# Patient Record
Sex: FEMALE | Race: OTHER | HISPANIC OR LATINO
[De-identification: names, ages, dates, MRNs, and addresses within clinical notes are randomized per-mention and may not be internally consistent; named-entity substitution may affect disease eponyms.]

---

## 2017-02-16 ENCOUNTER — APPOINTMENT (OUTPATIENT)
Dept: OTOLARYNGOLOGY | Facility: CLINIC | Age: 62
End: 2017-02-16

## 2017-02-16 VITALS
DIASTOLIC BLOOD PRESSURE: 84 MMHG | SYSTOLIC BLOOD PRESSURE: 128 MMHG | BODY MASS INDEX: 21.4 KG/M2 | WEIGHT: 109 LBS | HEIGHT: 60 IN

## 2017-02-16 DIAGNOSIS — E73.9 LACTOSE INTOLERANCE, UNSPECIFIED: ICD-10-CM

## 2017-02-16 DIAGNOSIS — I10 ESSENTIAL (PRIMARY) HYPERTENSION: ICD-10-CM

## 2017-02-16 DIAGNOSIS — K58.9 IRRITABLE BOWEL SYNDROME W/OUT DIARRHEA: ICD-10-CM

## 2017-02-16 DIAGNOSIS — F41.9 ANXIETY DISORDER, UNSPECIFIED: ICD-10-CM

## 2017-02-16 DIAGNOSIS — E78.00 PURE HYPERCHOLESTEROLEMIA, UNSPECIFIED: ICD-10-CM

## 2017-02-16 DIAGNOSIS — S22.39XA FRACTURE OF ONE RIB, UNSPECIFIED SIDE, INITIAL ENCOUNTER FOR CLOSED FRACTURE: ICD-10-CM

## 2017-02-16 DIAGNOSIS — M19.90 UNSPECIFIED OSTEOARTHRITIS, UNSPECIFIED SITE: ICD-10-CM

## 2017-02-16 DIAGNOSIS — Z82.3 FAMILY HISTORY OF STROKE: ICD-10-CM

## 2017-02-16 DIAGNOSIS — M26.601 RIGHT TEMPOROMANDIBULAR JOINT DISORDER,: ICD-10-CM

## 2017-02-16 DIAGNOSIS — S62.90XA UNSPECIFIED FRACTURE OF UNSPECIFIED WRIST AND HAND, INITIAL ENCOUNTER FOR CLOSED FRACTURE: ICD-10-CM

## 2017-02-16 DIAGNOSIS — Z86.018 PERSONAL HISTORY OF OTHER BENIGN NEOPLASM: ICD-10-CM

## 2017-02-18 PROBLEM — I10 HIGH BLOOD PRESSURE: Status: ACTIVE | Noted: 2017-02-16

## 2017-02-18 PROBLEM — F41.9 ANXIETY: Status: ACTIVE | Noted: 2017-02-18

## 2017-02-18 PROBLEM — S22.39XA FRACTURED RIB: Status: ACTIVE | Noted: 2017-02-16

## 2017-02-18 PROBLEM — K58.9 IBS (IRRITABLE BOWEL SYNDROME): Status: ACTIVE | Noted: 2017-02-16

## 2017-02-18 PROBLEM — E78.00 HIGH CHOLESTEROL: Status: ACTIVE | Noted: 2017-02-16

## 2017-02-18 PROBLEM — Z82.3 FAMILY HISTORY OF CEREBROVASCULAR ACCIDENT (CVA): Status: ACTIVE | Noted: 2017-02-16

## 2017-02-18 PROBLEM — E73.9 LACTOSE INTOLERANCE: Status: ACTIVE | Noted: 2017-02-18

## 2017-02-18 PROBLEM — S62.90XA FRACTURED HAND: Status: ACTIVE | Noted: 2017-02-16

## 2017-02-18 PROBLEM — M19.90 ARTHRITIS: Status: ACTIVE | Noted: 2017-02-16

## 2017-02-18 PROBLEM — Z86.018 HISTORY OF UTERINE LEIOMYOMA: Status: RESOLVED | Noted: 2017-02-18 | Resolved: 2017-02-18

## 2017-02-18 RX ORDER — VALSARTAN 320 MG/1
320 TABLET ORAL DAILY
Refills: 0 | Status: ACTIVE | COMMUNITY

## 2017-02-18 RX ORDER — PRAVASTATIN SODIUM 20 MG/1
20 TABLET ORAL DAILY
Refills: 0 | Status: ACTIVE | COMMUNITY

## 2017-02-18 RX ORDER — SIMETHICONE 250 MG/1
CAPSULE, GELATIN COATED ORAL
Refills: 0 | Status: ACTIVE | COMMUNITY

## 2017-02-18 RX ORDER — IBUPROFEN 200 MG/1
TABLET, COATED ORAL
Refills: 0 | Status: ACTIVE | COMMUNITY

## 2017-02-18 RX ORDER — NEBIVOLOL HYDROCHLORIDE 5 MG/1
5 TABLET ORAL DAILY
Refills: 0 | Status: ACTIVE | COMMUNITY

## 2017-02-18 RX ORDER — DIAZEPAM 5 MG/1
5 TABLET ORAL TWICE DAILY
Refills: 0 | Status: ACTIVE | COMMUNITY

## 2017-02-22 PROBLEM — M26.601 DISORDER OF RIGHT TEMPOROMANDIBULAR JOINT: Status: ACTIVE | Noted: 2017-02-22

## 2017-03-16 ENCOUNTER — APPOINTMENT (OUTPATIENT)
Dept: OTOLARYNGOLOGY | Facility: CLINIC | Age: 62
End: 2017-03-16

## 2017-03-16 VITALS
DIASTOLIC BLOOD PRESSURE: 74 MMHG | BODY MASS INDEX: 21.4 KG/M2 | WEIGHT: 109 LBS | SYSTOLIC BLOOD PRESSURE: 116 MMHG | HEIGHT: 60 IN

## 2017-11-16 ENCOUNTER — EMERGENCY (EMERGENCY)
Facility: HOSPITAL | Age: 62
LOS: 1 days | Discharge: HOME | End: 2017-11-16
Attending: EMERGENCY MEDICINE | Admitting: EMERGENCY MEDICINE
Payer: COMMERCIAL

## 2017-11-16 VITALS
OXYGEN SATURATION: 99 % | TEMPERATURE: 98 F | DIASTOLIC BLOOD PRESSURE: 75 MMHG | HEART RATE: 88 BPM | SYSTOLIC BLOOD PRESSURE: 108 MMHG | RESPIRATION RATE: 18 BRPM

## 2017-11-16 VITALS
HEART RATE: 95 BPM | TEMPERATURE: 98 F | RESPIRATION RATE: 18 BRPM | SYSTOLIC BLOOD PRESSURE: 107 MMHG | DIASTOLIC BLOOD PRESSURE: 70 MMHG | OXYGEN SATURATION: 97 %

## 2017-11-16 LAB
ALBUMIN SERPL ELPH-MCNC: 3.6 G/DL — SIGNIFICANT CHANGE UP (ref 3.3–5)
ALP SERPL-CCNC: 157 U/L — HIGH (ref 40–120)
ALT FLD-CCNC: 117 U/L RC — HIGH (ref 10–45)
ANION GAP SERPL CALC-SCNC: 14 MMOL/L — SIGNIFICANT CHANGE UP (ref 5–17)
APPEARANCE UR: CLEAR — SIGNIFICANT CHANGE UP
AST SERPL-CCNC: 43 U/L — HIGH (ref 10–40)
BACTERIA # UR AUTO: ABNORMAL /HPF
BASOPHILS # BLD AUTO: 0 K/UL — SIGNIFICANT CHANGE UP (ref 0–0.2)
BASOPHILS NFR BLD AUTO: 0.5 % — SIGNIFICANT CHANGE UP (ref 0–2)
BILIRUB SERPL-MCNC: 0.4 MG/DL — SIGNIFICANT CHANGE UP (ref 0.2–1.2)
BILIRUB UR-MCNC: NEGATIVE — SIGNIFICANT CHANGE UP
BUN SERPL-MCNC: 12 MG/DL — SIGNIFICANT CHANGE UP (ref 7–23)
CALCIUM SERPL-MCNC: 9.1 MG/DL — SIGNIFICANT CHANGE UP (ref 8.4–10.5)
CHLORIDE SERPL-SCNC: 99 MMOL/L — SIGNIFICANT CHANGE UP (ref 96–108)
CO2 SERPL-SCNC: 25 MMOL/L — SIGNIFICANT CHANGE UP (ref 22–31)
COLOR SPEC: YELLOW — SIGNIFICANT CHANGE UP
CREAT SERPL-MCNC: 0.47 MG/DL — LOW (ref 0.5–1.3)
DIFF PNL FLD: NEGATIVE — SIGNIFICANT CHANGE UP
EOSINOPHIL # BLD AUTO: 0 K/UL — SIGNIFICANT CHANGE UP (ref 0–0.5)
EOSINOPHIL NFR BLD AUTO: 0 % — SIGNIFICANT CHANGE UP (ref 0–6)
EPI CELLS # UR: SIGNIFICANT CHANGE UP /HPF
ERYTHROCYTE [SEDIMENTATION RATE] IN BLOOD: 63 MM/HR — HIGH (ref 0–20)
GLUCOSE SERPL-MCNC: 137 MG/DL — HIGH (ref 70–99)
GLUCOSE UR QL: NEGATIVE — SIGNIFICANT CHANGE UP
HCT VFR BLD CALC: 39.6 % — SIGNIFICANT CHANGE UP (ref 34.5–45)
HGB BLD-MCNC: 13.9 G/DL — SIGNIFICANT CHANGE UP (ref 11.5–15.5)
KETONES UR-MCNC: NEGATIVE — SIGNIFICANT CHANGE UP
LEUKOCYTE ESTERASE UR-ACNC: ABNORMAL
LYMPHOCYTES # BLD AUTO: 0.4 K/UL — LOW (ref 1–3.3)
LYMPHOCYTES # BLD AUTO: 7.5 % — LOW (ref 13–44)
MCHC RBC-ENTMCNC: 29.7 PG — SIGNIFICANT CHANGE UP (ref 27–34)
MCHC RBC-ENTMCNC: 35 GM/DL — SIGNIFICANT CHANGE UP (ref 32–36)
MCV RBC AUTO: 84.8 FL — SIGNIFICANT CHANGE UP (ref 80–100)
MONOCYTES # BLD AUTO: 0.2 K/UL — SIGNIFICANT CHANGE UP (ref 0–0.9)
MONOCYTES NFR BLD AUTO: 3.8 % — SIGNIFICANT CHANGE UP (ref 2–14)
NEUTROPHILS # BLD AUTO: 4.7 K/UL — SIGNIFICANT CHANGE UP (ref 1.8–7.4)
NEUTROPHILS NFR BLD AUTO: 88.2 % — HIGH (ref 43–77)
NITRITE UR-MCNC: NEGATIVE — SIGNIFICANT CHANGE UP
PH UR: 6.5 — SIGNIFICANT CHANGE UP (ref 5–8)
PLATELET # BLD AUTO: 277 K/UL — SIGNIFICANT CHANGE UP (ref 150–400)
POTASSIUM SERPL-MCNC: 3.6 MMOL/L — SIGNIFICANT CHANGE UP (ref 3.5–5.3)
POTASSIUM SERPL-SCNC: 3.6 MMOL/L — SIGNIFICANT CHANGE UP (ref 3.5–5.3)
PROT SERPL-MCNC: 7 G/DL — SIGNIFICANT CHANGE UP (ref 6–8.3)
PROT UR-MCNC: 30 MG/DL
RBC # BLD: 4.67 M/UL — SIGNIFICANT CHANGE UP (ref 3.8–5.2)
RBC # FLD: 11.7 % — SIGNIFICANT CHANGE UP (ref 10.3–14.5)
RBC CASTS # UR COMP ASSIST: ABNORMAL /HPF (ref 0–2)
SODIUM SERPL-SCNC: 138 MMOL/L — SIGNIFICANT CHANGE UP (ref 135–145)
SP GR SPEC: 1.01 — SIGNIFICANT CHANGE UP (ref 1.01–1.02)
UROBILINOGEN FLD QL: NEGATIVE — SIGNIFICANT CHANGE UP
WBC # BLD: 5.3 K/UL — SIGNIFICANT CHANGE UP (ref 3.8–10.5)
WBC # FLD AUTO: 5.3 K/UL — SIGNIFICANT CHANGE UP (ref 3.8–10.5)
WBC UR QL: ABNORMAL /HPF (ref 0–5)

## 2017-11-16 PROCEDURE — 86140 C-REACTIVE PROTEIN: CPT

## 2017-11-16 PROCEDURE — 87186 SC STD MICRODIL/AGAR DIL: CPT

## 2017-11-16 PROCEDURE — 85027 COMPLETE CBC AUTOMATED: CPT

## 2017-11-16 PROCEDURE — 99284 EMERGENCY DEPT VISIT MOD MDM: CPT

## 2017-11-16 PROCEDURE — 80053 COMPREHEN METABOLIC PANEL: CPT

## 2017-11-16 PROCEDURE — 81001 URINALYSIS AUTO W/SCOPE: CPT

## 2017-11-16 PROCEDURE — 87086 URINE CULTURE/COLONY COUNT: CPT

## 2017-11-16 PROCEDURE — 85652 RBC SED RATE AUTOMATED: CPT

## 2017-11-16 RX ORDER — CEPHALEXIN 500 MG
1 CAPSULE ORAL
Qty: 20 | Refills: 0 | OUTPATIENT
Start: 2017-11-16 | End: 2017-11-26

## 2017-11-16 RX ORDER — CEPHALEXIN 500 MG
1 CAPSULE ORAL
Qty: 10 | Refills: 0 | OUTPATIENT
Start: 2017-11-16 | End: 2017-11-21

## 2017-11-16 RX ORDER — CEPHALEXIN 500 MG
500 CAPSULE ORAL ONCE
Qty: 0 | Refills: 0 | Status: COMPLETED | OUTPATIENT
Start: 2017-11-16 | End: 2017-11-16

## 2017-11-16 RX ADMIN — Medication 500 MILLIGRAM(S): at 18:17

## 2017-11-16 NOTE — CONSULT NOTE ADULT - ATTENDING COMMENTS
I have reviewed the residents note including the history, exam, assessment, and plan.  I agree with the residents assessment and plan.    Halina Arshad MD

## 2017-11-16 NOTE — ED ADULT NURSE NOTE - OBJECTIVE STATEMENT
This 62 female in ED for headache onset Monday was admitted to [facility] in [place] for the past, was not happy with the care. Pt states was diagnosed with UTI and Temporal Arthritis at the [facility]

## 2017-11-16 NOTE — ED PROVIDER NOTE - MEDICAL DECISION MAKING DETAILS
62F h/o anxiety, HTN presents with headache, UTI. R/o temporal arteritis (ESR, steroids) vs. migraine (reglan, fluids, tylenol). UA, UCs, ABx if warranted.

## 2017-11-16 NOTE — CONSULT NOTE ADULT - SUBJECTIVE AND OBJECTIVE BOX
[facility] Ophthalmology Consult Note    HPI: 62F h/o anxiety, HTN, HLD, IBS presents here after leaving AMA from OSH after being admitted for 2 days for UTI and severe headache. Team wanted to do temporal artery biopsy due to high ESR but patient refused. Has had intermittent temporal headache left side for 4 days. Had fever few days ago. Lost weight over last several months due to abdominal pain from IBS. No scalp tenderness, jaw claudication, proximal muscle pain. No ocular visual signs, symptoms.    PMH: anxiety, HTN, HL  Meds:   · 	Diovan: Last Dose Taken:    · 	Bystolic: Last Dose Taken:    · 	pravastatin: Last Dose Taken:    · 	Valium: Last Dose Taken:    POcHx (including surgeries/lasers/trauma):  None  Drops: None  FamHx: None  Social Hx: None  Allergies: PCN, fluoroquinolones, lexapro, paxil    ROS:  General (neg), Vision (per HPI), Head and Neck (neg), Pulm (neg), CV (neg), GI (neg),  (neg), Musculoskeletal (neg), Skin/Integ (neg), Neuro (neg), Endocrine (neg), Heme (neg), All/Immuno (neg)    Mood and Affect Appropriate ( x ),  Oriented to Time, Place, and Person x 3 ( x )    Ophthalmology Exam    Visual acuity (sc): 20/20 OU  Pupils: PERRL OU, no APD  Ttono: 16 OU  Extraocular movements (EOMs): Full OU, no pain, no diplopia   Confrontational Visual Field (CVF):  Full OU  Color Plates: 12/12 OU    Pen Light Exam (PLE)  External:  Flat OU, no scalp or temporal artery tenderness, good temporal artery pulsations  Lids/Lashes/Lacrimal Ducts: Flat OU    Sclera/Conjunctiva:  W+Q OU  Cornea: Cl OU  Anterior Chamber: D+F OU  Iris:  Flat OU  Lens:  Cl OU    Fundus Exam: dilated with 1% tropicamide and 2.5% phenylephrine  Approval obtained from primary team for dilation  Patient aware that pupils can remained dilated for at least 4-6 hours  Exam performed with 20D lens    Vitreous: wnl OU  Disc, cup/disc: sharp and pink, 0.4 OU  Macula:  wnl OU  Vessels:  wnl OU  Periphery: wnl OU    ESR 63  Plt 277  CRP pending    Assessment: 61 yo F with 3 days temporal headache, recent fever. No other signs or symptoms of GCA. Normal eye exam - no ocular findings consistent with GCA. ESR high but ?from UTI. Plt normal and CRP pending.    Plan:  Follow CRP    Follow-Up:  Patient should follow up with neuro-ophthalmology (Dr Mckenna) on Monday  [address]  [place] [address]  [phone] [facility] Ophthalmology Consult Note    HPI: 62F h/o anxiety, HTN, HLD, IBS presents here after leaving AMA from OSH after being admitted for 2 days for UTI and severe headache. Team wanted to do temporal artery biopsy due to high ESR but patient refused. Has had intermittent temporal headache left side for 4 days. Had fever few days ago. Lost weight over last several months due to abdominal pain from IBS. No scalp tenderness, jaw claudication, proximal muscle pain. No ocular visual signs, symptoms.    PMH: anxiety, HTN, HL  Meds:   · 	Diovan: Last Dose Taken:    · 	Bystolic: Last Dose Taken:    · 	pravastatin: Last Dose Taken:    · 	Valium: Last Dose Taken:    POcHx (including surgeries/lasers/trauma):  None  Drops: None  FamHx: None  Social Hx: None  Allergies: PCN, fluoroquinolones, lexapro, paxil    ROS:  General (neg), Vision (per HPI), Head and Neck (neg), Pulm (neg), CV (neg), GI (neg),  (neg), Musculoskeletal (neg), Skin/Integ (neg), Neuro (neg), Endocrine (neg), Heme (neg), All/Immuno (neg)    Mood and Affect Appropriate ( x ),  Oriented to Time, Place, and Person x 3 ( x )    Ophthalmology Exam    Visual acuity (sc): 20/20 OU  Pupils: PERRL OU, no APD  Ttono: 16 OU  Extraocular movements (EOMs): Full OU, no pain, no diplopia   Confrontational Visual Field (CVF):  Full OU  Color Plates: 12/12 OU    Pen Light Exam (PLE)  External:  Flat OU, no scalp or temporal artery tenderness, good temporal artery pulsations  Lids/Lashes/Lacrimal Ducts: Flat OU    Sclera/Conjunctiva:  W+Q OU  Cornea: Cl OU  Anterior Chamber: D+F OU  Iris:  Flat OU  Lens:  Cl OU    Fundus Exam: dilated with 1% tropicamide and 2.5% phenylephrine  Approval obtained from primary team for dilation  Patient aware that pupils can remained dilated for at least 4-6 hours  Exam performed with 20D lens    Vitreous: wnl OU  Disc, cup/disc: sharp and pink, 0.4 OU  Macula:  wnl OU  Vessels:  wnl OU  Periphery: wnl OU    ESR 63  Plt 277  CRP pending

## 2017-11-16 NOTE — ED PROVIDER NOTE - PLAN OF CARE
Call your primary care doctor and ophthomologist today or tomorrow to schedule follow up appointment for within the next 3-5 days.  Continue taking your medications as prescribed.  Return to this Emergency Department if you experience worsening condition or for any other emergency.

## 2017-11-16 NOTE — ED ADULT NURSE REASSESSMENT NOTE - NS ED NURSE REASSESS COMMENT FT1
report received from Beverley THOMPSON  pt mentating at baseline. no change in neuro status. pt denies pain at this time. pt placed in position of comfort, given warm blankets. safety maintained. will continue to monitor, support and safety provided. awaiting dispo

## 2017-11-16 NOTE — ED PROVIDER NOTE - OBJECTIVE STATEMENT
62F h/o anxiety, HTN, HLD presents here after leaving AMA from OSH after being admitted for 2 days for UTI and severe headache, which was diagnosed there as temporal arteritis due to "high" ESR rate, but patient refused biopsy. Here in the ED she still complains of dysuria, as well as right sided temporal headache, sensitive to light. She was given steroids. She has no other complaints. Denies fever, chills, chest pain. SOB.

## 2017-11-16 NOTE — ED PROVIDER NOTE - CARE PLAN
Principal Discharge DX:	Headache  Instructions for follow-up, activity and diet:	Call your primary care doctor and ophthomologist today or tomorrow to schedule follow up appointment for within the next 3-5 days.  Continue taking your medications as prescribed.  Return to this Emergency Department if you experience worsening condition or for any other emergency.  Secondary Diagnosis:	UTI (urinary tract infection)

## 2017-11-16 NOTE — ED PROVIDER NOTE - CHIEF COMPLAINT
The patient is a 62y Female complaining of see chief complaint quote. The patient is a 62y Female complaining of headache.

## 2017-11-16 NOTE — CONSULT NOTE ADULT - ASSESSMENT
Assessment: 63 yo F with 3 days temporal headache, recent fever. No other signs or symptoms of GCA. Normal eye exam - no ocular findings consistent with GCA. ESR high but ?from UTI. Plt normal and CRP pending.    Plan:  Follow CRP    Follow-Up:  Patient should follow up with neuro-ophthalmology (Dr Mckenna) on Monday  [address]  [place] [address]  [phone]

## 2017-11-16 NOTE — ED PROVIDER NOTE - ATTENDING CONTRIBUTION TO CARE
62F h/o anxiety, HTN, HLD presents here after leaving AMA from OSH after being admitted for 2 days for UTI and severe headache, which was diagnosed there as temporal arteritis due to "high" ESR rate, but patient refused biopsy because her daughter "didn't think she was getting the care she needed." daughter signed her out AMA today because "[place] is too far from my home" and "I wanted her closer." pt states her headache is completely resolved, but has ongoing mild photophobia. states over last week has had left temporal pain which she states is now resolved as well. no n/v. no neck pain or meningismus, no f/c.     ROS:   constitutional - no fever, no chills  eyes - no visual changes, no redness  eent - no sore throat, no nasal congestion  cvs - no chest pain, no leg swelling  resp - no shortness of breath, no cough  gi - no abdominal pain, no vomiting, no diarrhea  gu - no dysuria, no hematuria  msk - no acute back pain, no joint swelling  skin - no rashes, no jaundice  neuro - + headache, no focal weakness  psych - no acute mental health issue     Physical Exam:   constitutional - well appearing, awake and alert, oriented x3  head - no external evidence of trauma  cvs - rrr, no murmurs, no peripheral edema  resp - breath sounds clear and equal bilat  gi - abdomen soft and nontender, no rigidity, guarding or rebound, bowel sounds present  msk - moving all extremities spontaneously  neuro - alert and oriented x3, no focal deficits, CNs 2-12 grossly intact. no pronator drift. strength 5/5 in all ext. no meningismus. no cerebellar ataxia. eoms intact. no overt papilledema. no temporal tenderness, no hyperesthesia.   skin- no jaundice, warm and dry  psych - mood and affect wnl, no apparent risk to self or others 62F h/o anxiety, HTN, HLD presents here after leaving AMA from OSH after being admitted for 2 days for UTI and severe headache, which was diagnosed there as temporal arteritis due to "high" ESR rate, but patient refused biopsy because her daughter "didn't think she was getting the care she needed." daughter signed her out AMA today because "[place] is too far from my home" and "I wanted her closer." pt states her headache is completely resolved, but has ongoing mild photophobia. states over last week has had left temporal pain which she states is now resolved as well. no n/v. no neck pain or meningismus, no f/c.     ROS:   constitutional - no fever, no chills  eyes - no visual changes, no redness  eent - no sore throat, no nasal congestion  cvs - no chest pain, no leg swelling  resp - no shortness of breath, no cough  gi - no abdominal pain, no vomiting, no diarrhea  gu - no dysuria, no hematuria  msk - no acute back pain, no joint swelling  skin - no rashes, no jaundice  neuro - + headache, no focal weakness  psych - no acute mental health issue     Physical Exam:   constitutional - well appearing, awake and alert, oriented x3  head - no external evidence of trauma  cvs - rrr, no murmurs, no peripheral edema  resp - breath sounds clear and equal bilat  gi - abdomen soft and nontender, no rigidity, guarding or rebound, bowel sounds present  msk - moving all extremities spontaneously  neuro - alert and oriented x3, no focal deficits, CNs 2-12 grossly intact. no pronator drift. strength 5/5 in all ext. no meningismus. no cerebellar ataxia. eoms intact. no overt papilledema. no temporal tenderness, no hyperesthesia.   skin- no jaundice, warm and dry  psych - mood and affect wnl, no apparent risk to self or others    paperwork reviwed from [facility] - ct head wnl, ua pos for ecoli and cultures sensitive for cephalosporins. team did not fax the sed rate. pt seen by Ophtho in ED without any acute eye findings. pt states headache is mostly resolved. ophtho recommended no steroids, and to f/u w neurooptho Pomeranz on monday. 62F h/o anxiety, HTN, HLD presents here after leaving AMA from OSH after being admitted for 2 days for UTI and severe headache, which was diagnosed there as temporal arteritis due to "high" ESR rate, but patient refused biopsy because her daughter "didn't think she was getting the care she needed." daughter signed her out AMA today because "[place] is too far from my home" and "I wanted her closer." daughter in ED w expectation for direct admission to continue w/u that she was undergoing at prior hospital - family has no records from prior hospitalization. pt states her headache is currently completely resolved, but has ongoing mild photophobia. states over last week has had left temporal pain which she states is now resolved as well. no n/v. no neck pain or meningismus, no f/c. states was dx w uti at prior hospital and was receiving iv abx. no f/c, no current uti sxs.    ROS:   constitutional - no fever, no chills  eyes - no visual changes, no redness  eent - no sore throat, no nasal congestion  cvs - no chest pain, no leg swelling  resp - no shortness of breath, no cough  gi - no abdominal pain, no vomiting, no diarrhea  gu - no dysuria, no hematuria  msk - no acute back pain, no joint swelling  skin - no rashes, no jaundice  neuro - + headache, no focal weakness  psych - no acute mental health issue     Physical Exam:   constitutional - well appearing, awake and alert, oriented x3, wearing sunglasses in ED  head - no external evidence of trauma  cvs - rrr, no murmurs, no peripheral edema  resp - breath sounds clear and equal bilat  gi - abdomen soft and nontender, no rigidity, guarding or rebound, bowel sounds present  msk - moving all extremities spontaneously  neuro - alert and oriented x3, no focal deficits, CNs 2-12 grossly intact. no pronator drift. strength 5/5 in all ext. no meningismus. no cerebellar ataxia. eoms intact. no overt papilledema. no temporal tenderness, no hyperesthesia. no rashes overlying face. pupils elie.  skin- no jaundice, warm and dry  psych - mood and affect wnl, no apparent risk to self or others    paperwork reviwed from [facility] after written consent obtained and faxed to [facility] - ct head wnl, ua pos for ecoli and cultures sensitive for cephalosporins. team did not fax the sed rate. pt states she was never seen by ophtho at prior hospital and that they were "planning to do a biopsy." pt seen by Ophtho in ED without any acute eye findings. pt states headache is mostly resolved. ophtho recommended no steroids, and to f/u w neurooptho Pomeranz on monday. pt remains asymptomatic at this time. sed rate in ED moderately elevated however pt w/o temporal pain at this time. low to mod suspicion of temporal arteritis at this time - will have outpt f/u w neuro-ophtho. will dc w abx for uti. additional verbal instructions regarding diagnosis, return precautions and follow up plan given to pt and/or family. THOMAS Tony MD

## 2017-11-17 RX ORDER — NEBIVOLOL HYDROCHLORIDE 5 MG/1
0 TABLET ORAL
Qty: 0 | Refills: 0 | COMMUNITY

## 2017-11-17 RX ORDER — DIAZEPAM 5 MG
0 TABLET ORAL
Qty: 0 | Refills: 0 | COMMUNITY

## 2017-11-17 RX ORDER — VALSARTAN 80 MG/1
0 TABLET ORAL
Qty: 0 | Refills: 0 | COMMUNITY

## 2017-11-17 NOTE — ED POST DISCHARGE NOTE - ADDITIONAL DOCUMENTATION
UC prelim report: 10-49k E. Faecalis and 10-49k coag neg staph, sensitivities pending. pt d/c'd on Keflex. f/u sensitivities. - LEO Simmons UC prelim report: 10-49k E. Faecalis and 10-49k coag neg staph, sensitivities pending. pt d/c'd on Keflex. f/u sensitivities. - LEO Simmons                11/19/17: UCx growing enterococcus will nee to change to doxy as patient is allergic to cipro.  Attempted to contact patient.  left VM.  -Reji Palacios PA-C UC prelim report: 10-49k E. Faecalis and 10-49k coag neg staph, sensitivities pending. pt d/c'd on Keflex. f/u sensitivities. - LEO Simmons                11/19/17: UCx growing enterococcus will nee to change to doxy as patient is allergic to cipro.  Attempted to contact patient.  left VM.  -Reji Palacios PA-C           Patient called back.  Rx for doxycycline sent to patient's pharmacy.  Patient instructed to stop keflex and follow up with her PMD.  -Reji Palacios PA-C

## 2017-11-17 NOTE — ED POST DISCHARGE NOTE - OTHER COMMUNICATION
Patient seen by optho in ED, aware of elevated ESR at 63. Patient given follow up appointment w/ neuro-optho Dr. Bala Lucas PA-C

## 2017-11-18 ENCOUNTER — EMERGENCY (EMERGENCY)
Facility: HOSPITAL | Age: 62
LOS: 1 days | Discharge: HOME | End: 2017-11-18
Attending: EMERGENCY MEDICINE | Admitting: EMERGENCY MEDICINE
Payer: COMMERCIAL

## 2017-11-18 VITALS
SYSTOLIC BLOOD PRESSURE: 132 MMHG | DIASTOLIC BLOOD PRESSURE: 81 MMHG | OXYGEN SATURATION: 97 % | HEART RATE: 94 BPM | RESPIRATION RATE: 18 BRPM | TEMPERATURE: 98 F

## 2017-11-18 VITALS
RESPIRATION RATE: 16 BRPM | TEMPERATURE: 98 F | SYSTOLIC BLOOD PRESSURE: 145 MMHG | OXYGEN SATURATION: 100 % | DIASTOLIC BLOOD PRESSURE: 93 MMHG | WEIGHT: 104.06 LBS | HEART RATE: 99 BPM

## 2017-11-18 DIAGNOSIS — Z98.890 OTHER SPECIFIED POSTPROCEDURAL STATES: Chronic | ICD-10-CM

## 2017-11-18 DIAGNOSIS — Z90.710 ACQUIRED ABSENCE OF BOTH CERVIX AND UTERUS: Chronic | ICD-10-CM

## 2017-11-18 DIAGNOSIS — Z90.49 ACQUIRED ABSENCE OF OTHER SPECIFIED PARTS OF DIGESTIVE TRACT: Chronic | ICD-10-CM

## 2017-11-18 PROCEDURE — 99283 EMERGENCY DEPT VISIT LOW MDM: CPT

## 2017-11-18 PROCEDURE — 99284 EMERGENCY DEPT VISIT MOD MDM: CPT

## 2017-11-18 RX ADMIN — Medication 10 MILLIGRAM(S): at 16:02

## 2017-11-18 RX ADMIN — Medication 40 MILLIGRAM(S): at 10:04

## 2017-11-18 NOTE — ED ADULT NURSE REASSESSMENT NOTE - NS ED NURSE REASSESS COMMENT FT1
Ambulated to bathroom. Pt is in no current distress. Comfort and safety provided. Awaiting vascular consult. Will continue to monitor.

## 2017-11-18 NOTE — CONSULT NOTE ADULT - SUBJECTIVE AND OBJECTIVE BOX
[facility] Ophthalmology Consult Note    HPI: 62F h/o anxiety, HTN, HLD, IBS presents here after leaving AMA from OSH after being admitted for 2 days for UTI and severe headache. Team wanted to do temporal artery biopsy due to high ESR but patient refused. Has had intermittent temporal headache left side for 4 days. Had fever few days ago. Lost weight over last several months due to abdominal pain from IBS. No scalp tenderness, jaw claudication, proximal muscle pain. No ocular visual signs, symptoms.  Was seen yesterday in ED here found to have elevated ESR which could be atributed to UTI but with very elevated  CRP.  Patient left before results back, so was called in to get TAB and steroids today    PMH: anxiety, HTN, HL  Meds:   · 	Diovan: Last Dose Taken:    · 	Bystolic: Last Dose Taken:    · 	pravastatin: Last Dose Taken:    · 	Valium: Last Dose Taken:    POcHx (including surgeries/lasers/trauma):  None  Drops: None  FamHx: None  Social Hx: None  Allergies: PCN, fluoroquinolones, lexapro, paxil    ROS:  General (neg), Vision (per HPI), Head and Neck (neg), Pulm (neg), CV (neg), GI (neg),  (neg), Musculoskeletal (neg), Skin/Integ (neg), Neuro (neg), Endocrine (neg), Heme (neg), All/Immuno (neg)    Mood and Affect Appropriate ( x ),  Oriented to Time, Place, and Person x 3 ( x )    Ophthalmology Exam    Visual acuity (sc): 20/20 OU  Pupils: PERRL OU, no APD  Ttono: 16 OU  Extraocular movements (EOMs): Full OU, no pain, no diplopia   Confrontational Visual Field (CVF):  Full OU  Color Plates: 12/12 OU    Pen Light Exam (PLE)  External:  Flat OU, no scalp or temporal artery tenderness, good temporal artery pulsations  Lids/Lashes/Lacrimal Ducts: Flat OU    Sclera/Conjunctiva:  W+Q OU  Cornea: Cl OU  Anterior Chamber: D+F OU  Iris:  Flat OU  Lens:  Cl OU    Fundus Exam: dilated with 1% tropicamide and 2.5% phenylephrine  Approval obtained from primary team for dilation  Patient aware that pupils can remained dilated for at least 4-6 hours  Exam performed with 20D lens    Vitreous: wnl OU  Disc, cup/disc: sharp and pink, 0.4 OU  Macula:  wnl OU  Vessels:  wnl OU  Periphery: wnl OU    ESR 63  Plt 277  CRP 20    Assessment: 63 yo F with 3 days temporal headache, recent fever. No other signs or symptoms of GCA. Normal eye exam - no ocular findings consistent with GCA. ESR high but ?from UTI. Plt normal and CRP very elevated  concerning for GCA    Plan:  High dose steroids  Rheumatology consult   Vascular surgery consult for temporal artery biopsy  Will follow - call if any changes in vision     Follow-Up:  Patient should follow up with neuro-ophthalmology (Dr Mckenna) on within 1 week of discharge  [address]  [place] [address]  [phone]

## 2017-11-18 NOTE — ED PROVIDER NOTE - ATTENDING CONTRIBUTION TO CARE
------------ATTENDING NOTE------------   61 yo F w/ daughter sent to ED by Ophthalmology for concerns of bilateral photophobia, intermittent L temporal mild dull throbbing headache, elevated ESR / prominent tender temporal artery, awaiting Vasc Surg and Rheum consults -->  - Suresh Galicia MD   ------------------------------------------------------

## 2017-11-18 NOTE — ED PROVIDER NOTE - PROGRESS NOTE DETAILS
Spoke with Opthalmology service, who was familiar with case, and recommended vascular surgery consult for temporal artery biopsy, as well as rheumatology consult. Recommend treatment with steroid and admission, where they will follow patient. Spoke with Vascular Surgery Service. Will come see patient in ED to evaluate for temporal artery biopsy. Paged Rheumatology service for consult. ------------ATTENDING NOTE------------   pt / daughter upset over ED time course, waiting for consults, multiple visits -- multiple attempts to apologize / explain -- Suresh Galicia MD   -------------------------------------------------------------- Spoke with rheumatology. Can see patient on an outpatient basis, and indicate that patient does not require admission. Spoke with vascular surgery again. Indicated that they would come by later to see patient in department. Spoke with Dr. Vinson of Rheumatology. Discussed patient case. She will see patient in clinic and will be calling patient to set up appointment. Recommends 50mg prednisone PO qd until patient sees Dr. Vinson in the office. Dr. Vinson office #: [phone] Spoke with patient. They do not want to wait for vascular surgery to come down in the ER and will set up temporal artery biopsy as outpatient. They understand that they will receive a call from Dr. Vinson about setting up an appointment. Will send patient home on prednisone 50mg qd as recommended by Dr. Vinson.

## 2017-11-18 NOTE — ED PROVIDER NOTE - CARE PLAN
Principal Discharge DX:	Temporal arteritis Principal Discharge DX:	Temporal arteritis  Secondary Diagnosis:	Anxiety about health Principal Discharge DX:	Temporal arteritis  Instructions for follow-up, activity and diet:	Please make an appointment with Dr. Vinson, rheumatology. She also has your contact information, and should be calling you. Her office # is [phone]. Please schedule an appointment with vascular surgery for a temporal artery biopsy as soon as possible. Please take your medication, Prednisone 50 mg, once per day by mouth. Please complete the medication and follow all instructions in the medication package insert. Please return to the ED if you experience worsening headache, pain, dizziness, nausea, vision change, vision loss, or for any other concerns.  Secondary Diagnosis:	Anxiety about health

## 2017-11-18 NOTE — ED ADULT NURSE REASSESSMENT NOTE - NS ED NURSE REASSESS COMMENT FT1
Pt is in no current distress. Comfort and safety provided. Daughter at bedside. Awaiting vascular consult.

## 2017-11-18 NOTE — ED ADULT NURSE NOTE - OBJECTIVE STATEMENT
62 y.o. Female presents to the ED accompanied by daughter for L temporal headache since Monday night. Hx temporal arteritis. UTI - currently on antibiotics. Daughter at bedside reports pt was recently admitting in [facility] on tuesday, was told pt's ESR level was high and was started on prednisone for biposy - Pt did not want to do biopsy in that hospital and signed out AMA. As per daughter, Dr. Nobles (optho) told pt to come to ED yesterday to be admitted to get a biopsy done here. Pt is currently wearing sunglasses because "light bothers" the pt. Neuro intact. PERRL. +ROM in all four extremities. Ambulatory. Denies vision/hearing changes, numbness/tingling, CP, SOB, N/V/D, urinary/bowel complications, fever/chills. Pt is in no current distress. comfort and safety provided. Will continue to monitor. Dr. Parsons at bedside for assessment.

## 2017-11-18 NOTE — ED PROVIDER NOTE - NEUROLOGICAL, MLM
Alert and oriented, no focal deficits, no motor or sensory deficits. Cranial nerves intact. 5/5 strength all 4 extremities. Normal gait. No asterixis.

## 2017-11-18 NOTE — ED PROVIDER NOTE - OBJECTIVE STATEMENT
63 yo F w/ PMH of anxiety, HTN, HLD presenting at request of Dr. Velasquez for admission for temporal artery biopsy due to suspected temporal arteritis. Patient has had intermittent left temporal headaches for previous 6 days. Patient was initially seen and admitted at [facility] for severe headache and for UTI. Found to have an elevated ESR at [facility], but patient signed out AMA because patient and daughter were not satisfied with care patient was receiving. Patient refused temporal artery biopsy at [facility]. Patient then presented to [facility] on 11/16/2017,     Patient endorses mild left scalp tenderness and photophobia, and intermittent left temporal headache. but denies jaw claudication, proximal muscle pain. No ocular visual signs, symptoms. Denies fever, chills, chest pain, SOB, n/v/d/c.

## 2017-11-18 NOTE — ED ADULT NURSE REASSESSMENT NOTE - NS ED NURSE REASSESS COMMENT FT1
Pt is in no current distress. Comfort and safety provided. Daughter at bedside. Will continue to monitor. Awaiting vascular consult.

## 2017-11-18 NOTE — ED PROVIDER NOTE - CHIEF COMPLAINT
The patient is a 62y Female complaining of The patient is a 62y Female complaining of temporal headache.

## 2017-11-18 NOTE — ED PROVIDER NOTE - MEDICAL DECISION MAKING DETAILS
63 yo F presenting at request of ophthalmology for admission for temporal artery biopsy. Will speak with ophthalmology and consult vascular surgery for temporal artery biopsy. Will admit patient for biopsy. Will treat patient with initial dose of prednisone 40 mg and consult rheumatology. 63 yo F presenting at request of ophthalmology for admission for temporal artery biopsy. Will speak with ophthalmology and consult vascular surgery for temporal artery biopsy.  Will treat patient with initial dose of prednisone 40 mg and consult rheumatology.

## 2017-11-18 NOTE — ED PROVIDER NOTE - PLAN OF CARE
Please make an appointment with Dr. Vinson, rheumatology. She also has your contact information, and should be calling you. Her office # is [phone]. Please schedule an appointment with vascular surgery for a temporal artery biopsy as soon as possible. Please take your medication, Prednisone 50 mg, once per day by mouth. Please complete the medication and follow all instructions in the medication package insert. Please return to the ED if you experience worsening headache, pain, dizziness, nausea, vision change, vision loss, or for any other concerns.

## 2017-11-19 LAB
-  AMPICILLIN: SIGNIFICANT CHANGE UP
-  CIPROFLOXACIN: SIGNIFICANT CHANGE UP
-  NITROFURANTOIN: SIGNIFICANT CHANGE UP
-  TETRACYCLINE: SIGNIFICANT CHANGE UP
-  VANCOMYCIN: SIGNIFICANT CHANGE UP
CULTURE RESULTS: SIGNIFICANT CHANGE UP
METHOD TYPE: SIGNIFICANT CHANGE UP
ORGANISM # SPEC MICROSCOPIC CNT: SIGNIFICANT CHANGE UP
ORGANISM # SPEC MICROSCOPIC CNT: SIGNIFICANT CHANGE UP
SPECIMEN SOURCE: SIGNIFICANT CHANGE UP

## 2017-11-20 ENCOUNTER — OTHER (OUTPATIENT)
Age: 62
End: 2017-11-20

## 2017-11-20 RX ORDER — PREDNISONE 20 MG/1
20 TABLET ORAL
Qty: 100 | Refills: 0 | Status: ACTIVE | COMMUNITY
Start: 2017-11-20 | End: 1900-01-01

## 2017-11-27 ENCOUNTER — APPOINTMENT (OUTPATIENT)
Dept: VASCULAR SURGERY | Facility: CLINIC | Age: 62
End: 2017-11-27
Payer: COMMERCIAL

## 2017-11-27 VITALS
HEART RATE: 67 BPM | DIASTOLIC BLOOD PRESSURE: 85 MMHG | TEMPERATURE: 97.9 F | BODY MASS INDEX: 20.42 KG/M2 | SYSTOLIC BLOOD PRESSURE: 138 MMHG | WEIGHT: 104 LBS | HEIGHT: 60 IN

## 2017-11-27 PROCEDURE — 93880 EXTRACRANIAL BILAT STUDY: CPT

## 2017-11-27 PROCEDURE — 99204 OFFICE O/P NEW MOD 45 MIN: CPT | Mod: 25

## 2017-12-01 ENCOUNTER — RESULT REVIEW (OUTPATIENT)
Age: 62
End: 2017-12-01

## 2017-12-01 ENCOUNTER — APPOINTMENT (OUTPATIENT)
Dept: VASCULAR SURGERY | Facility: HOSPITAL | Age: 62
End: 2017-12-01

## 2017-12-01 ENCOUNTER — OUTPATIENT (OUTPATIENT)
Dept: OUTPATIENT SERVICES | Facility: HOSPITAL | Age: 62
LOS: 1 days | Discharge: HOME | End: 2017-12-01
Payer: COMMERCIAL

## 2017-12-01 VITALS
RESPIRATION RATE: 16 BRPM | SYSTOLIC BLOOD PRESSURE: 158 MMHG | HEART RATE: 66 BPM | OXYGEN SATURATION: 100 % | TEMPERATURE: 97 F | DIASTOLIC BLOOD PRESSURE: 77 MMHG

## 2017-12-01 VITALS
OXYGEN SATURATION: 100 % | HEIGHT: 63 IN | SYSTOLIC BLOOD PRESSURE: 133 MMHG | TEMPERATURE: 98 F | HEART RATE: 71 BPM | DIASTOLIC BLOOD PRESSURE: 72 MMHG | WEIGHT: 231.49 LBS | RESPIRATION RATE: 16 BRPM

## 2017-12-01 DIAGNOSIS — Z90.49 ACQUIRED ABSENCE OF OTHER SPECIFIED PARTS OF DIGESTIVE TRACT: Chronic | ICD-10-CM

## 2017-12-01 DIAGNOSIS — Z98.890 OTHER SPECIFIED POSTPROCEDURAL STATES: Chronic | ICD-10-CM

## 2017-12-01 DIAGNOSIS — M31.6 OTHER GIANT CELL ARTERITIS: ICD-10-CM

## 2017-12-01 DIAGNOSIS — Z90.710 ACQUIRED ABSENCE OF BOTH CERVIX AND UTERUS: Chronic | ICD-10-CM

## 2017-12-01 PROCEDURE — 88305 TISSUE EXAM BY PATHOLOGIST: CPT | Mod: 26

## 2017-12-01 PROCEDURE — 37609 LIGATION/BX TEMPORAL ARTERY: CPT | Mod: 50

## 2017-12-01 PROCEDURE — 88313 SPECIAL STAINS GROUP 2: CPT | Mod: 26

## 2017-12-01 PROCEDURE — 93010 ELECTROCARDIOGRAM REPORT: CPT

## 2017-12-01 RX ORDER — FENTANYL CITRATE 50 UG/ML
25 INJECTION INTRAVENOUS
Qty: 0 | Refills: 0 | Status: DISCONTINUED | OUTPATIENT
Start: 2017-12-01 | End: 2017-12-01

## 2017-12-01 RX ORDER — ONDANSETRON 8 MG/1
4 TABLET, FILM COATED ORAL ONCE
Qty: 0 | Refills: 0 | Status: DISCONTINUED | OUTPATIENT
Start: 2017-12-01 | End: 2017-12-01

## 2017-12-01 RX ORDER — FENTANYL CITRATE 50 UG/ML
45 INJECTION INTRAVENOUS
Qty: 0 | Refills: 0 | Status: DISCONTINUED | OUTPATIENT
Start: 2017-12-01 | End: 2017-12-01

## 2017-12-01 RX ORDER — OXYCODONE HYDROCHLORIDE 5 MG/1
5 TABLET ORAL ONCE
Qty: 0 | Refills: 0 | Status: DISCONTINUED | OUTPATIENT
Start: 2017-12-01 | End: 2017-12-01

## 2017-12-01 RX ORDER — ACETAMINOPHEN 500 MG
1 TABLET ORAL
Qty: 20 | Refills: 0 | OUTPATIENT
Start: 2017-12-01 | End: 2017-12-06

## 2017-12-01 RX ORDER — IBUPROFEN 200 MG
400 TABLET ORAL ONCE
Qty: 0 | Refills: 0 | Status: COMPLETED | OUTPATIENT
Start: 2017-12-01 | End: 2017-12-01

## 2017-12-01 RX ORDER — IBUPROFEN 200 MG
1 TABLET ORAL
Qty: 20 | Refills: 0 | OUTPATIENT
Start: 2017-12-01 | End: 2017-12-06

## 2017-12-01 RX ORDER — SODIUM CHLORIDE 9 MG/ML
1000 INJECTION, SOLUTION INTRAVENOUS
Qty: 0 | Refills: 0 | Status: DISCONTINUED | OUTPATIENT
Start: 2017-12-01 | End: 2017-12-16

## 2017-12-01 RX ADMIN — FENTANYL CITRATE 45 MICROGRAM(S): 50 INJECTION INTRAVENOUS at 19:47

## 2017-12-01 RX ADMIN — SODIUM CHLORIDE 30 MILLILITER(S): 9 INJECTION, SOLUTION INTRAVENOUS at 14:55

## 2017-12-01 RX ADMIN — FENTANYL CITRATE 45 MICROGRAM(S): 50 INJECTION INTRAVENOUS at 21:03

## 2017-12-01 RX ADMIN — Medication 400 MILLIGRAM(S): at 21:03

## 2017-12-01 RX ADMIN — Medication 400 MILLIGRAM(S): at 21:02

## 2017-12-01 RX ADMIN — SODIUM CHLORIDE 75 MILLILITER(S): 9 INJECTION, SOLUTION INTRAVENOUS at 19:00

## 2017-12-01 NOTE — H&P ADULT - NSHPPHYSICALEXAM_GEN_ALL_CORE
General: well developed, well nourished, NAD  Neuro: alert and oriented, no focal deficits, moves all extremities spontaneously  HEENT: NCAT, EOMI, anicteric, mucosa moist  Respiratory: airway patent, respirations unlabored  CVS: regular rate and rhythm  Abdomen: soft, nontender, nondistended  Extremities: no edema, sensation and movement grossly intact  Skin: warm, dry, appropriate color

## 2017-12-01 NOTE — H&P ADULT - PMH
Anxiety    Depression    HLD (hyperlipidemia)    HTN (hypertension)    Temporal arteritis  suspected, steroids started

## 2017-12-01 NOTE — H&P ADULT - HISTORY OF PRESENT ILLNESS
HPI: 61 yo F with history of IBS, HTN, HLD and anxiety, presenting for scheduled temporal artery biopsy. Patient began experiencing headache "feels like a hammer hitting my head," three weeks ago, and presented to outside hospital, where she was found to have elevated sed rate. Team at outside hospital recommended biopsy but patient declined. In the interim, patient was treated for UTI, but continued to have headaches (L sided) and is now following up for biopsy. Denies jaw claudication, tenderness to palpation in scalp, visual changes or deficits.    Patient was started on prednisone 11/18/2017. She was treated for a UTI with keflex and doxycycline.    PMHx: Depression/anxiety, HTN, HLD    PSHx: knee arthroscopy, appendectomy, hysterectomy    Medications (home): valsartan, valium, nebivolol, pravastatin  Allergies: PCN (amoxicillin, augmentin), fluoroquinolones (cipro, levaquin), Lexapro, Paxil  Social Hx: Denies tobacco use, denies EtOH use

## 2017-12-01 NOTE — ASU DISCHARGE PLAN (ADULT/PEDIATRIC). - NURSING INSTRUCTIONS
DO NOT take any Tylenol (Acetaminophen) or narcotics containing Tylenol until after  12:15am 12/2/17 . You received Tylenol during your operation and it can cause damage to your liver if too much is taken within a 24 hour time period.

## 2017-12-01 NOTE — ASU DISCHARGE PLAN (ADULT/PEDIATRIC). - NOTIFY
Bleeding that does not stop Pain not relieved by Medications/Numbness, color, or temperature change to extremity/Unable to Urinate/Swelling that continues/Fever greater than 101/Bleeding that does not stop

## 2017-12-01 NOTE — BRIEF OPERATIVE NOTE - PROCEDURE
<<-----Click on this checkbox to enter Procedure Temporal artery biopsy  12/01/2017    Active  [id number]

## 2017-12-01 NOTE — ASU DISCHARGE PLAN (ADULT/PEDIATRIC). - MEDICATION SUMMARY - MEDICATIONS TO TAKE
I will START or STAY ON the medications listed below when I get home from the hospital:    predniSONE 50 mg oral tablet  -- 1 tab(s) by mouth once a day   -- It is very important that you take or use this exactly as directed.  Do not skip doses or discontinue unless directed by your doctor.  Obtain medical advice before taking any non-prescription drugs as some may affect the action of this medication.  Take with food or milk.    -- Indication: For Other giant cell arteritis    Percocet 5/325 oral tablet  -- 1 tab(s) by mouth every 6 hours x 3 days, As Needed -for severe pain MDD:4 tabs   -- Caution federal law prohibits the transfer of this drug to any person other  than the person for whom it was prescribed.  May cause drowsiness.  Alcohol may intensify this effect.  Use care when operating dangerous machinery.  This prescription cannot be refilled.  This product contains acetaminophen.  Do not use  with any other product containing acetaminophen to prevent possible liver damage.  Using more of this medication than prescribed may cause serious breathing problems.    -- Indication: For as needed for pain    Diovan  -- Indication: For HTN    Valium  -- Indication: For Home med    pravastatin  -- Indication: For HLD    doxycycline hyclate 100 mg oral capsule  -- 1 cap(s) by mouth every 12 hours   -- Avoid prolonged or excessive exposure to direct and/or artificial sunlight while taking this medication.  Do not take this drug if you are pregnant.  Finish all this medication unless otherwise directed by prescriber.  Medication should be taken with plenty of water.    -- Indication: For home med    Bystolic  -- Indication: For HTN    Keflex 500 mg oral capsule  -- 1 cap(s) by mouth every 12 hours   -- Finish all this medication unless otherwise directed by prescriber.    -- Indication: For Home med I will START or STAY ON the medications listed below when I get home from the hospital:    predniSONE 50 mg oral tablet  -- 1 tab(s) by mouth once a day   -- It is very important that you take or use this exactly as directed.  Do not skip doses or discontinue unless directed by your doctor.  Obtain medical advice before taking any non-prescription drugs as some may affect the action of this medication.  Take with food or milk.    -- Indication: For Other giant cell arteritis    Advil 200 mg oral tablet  -- 1 tab(s) by mouth every 6 hours MDD:4 tabs; moderate to severe pain  -- Do not take this drug if you are pregnant.  It is very important that you take or use this exactly as directed.  Do not skip doses or discontinue unless directed by your doctor.  May cause drowsiness or dizziness.  Obtain medical advice before taking any non-prescription drugs as some may affect the action of this medication.  Take with food or milk.    -- Indication: For moderate to severe pain    Tylenol 325 mg oral tablet  -- 1 tab(s) by mouth every 6 hours MDD:4 tabs; mild pain  -- This product contains acetaminophen.  Do not use  with any other product containing acetaminophen to prevent possible liver damage.    -- Indication: For mild pain    Diovan  -- Indication: For HTN    Valium  -- Indication: For Home med    pravastatin  -- Indication: For HLD    doxycycline hyclate 100 mg oral capsule  -- 1 cap(s) by mouth every 12 hours   -- Avoid prolonged or excessive exposure to direct and/or artificial sunlight while taking this medication.  Do not take this drug if you are pregnant.  Finish all this medication unless otherwise directed by prescriber.  Medication should be taken with plenty of water.    -- Indication: For home med    Bystolic  -- Indication: For HTN    Keflex 500 mg oral capsule  -- 1 cap(s) by mouth every 12 hours   -- Finish all this medication unless otherwise directed by prescriber.    -- Indication: For Home med

## 2017-12-01 NOTE — H&P ADULT - ASSESSMENT
63 yo F with elevated ESR and temporal headaches concerning for temporal arteritis, for scheduled outpatient temporal artery biopsy with Dr. Rich.    - NPO  - place IV, start IV fluid at 30cc/hour  - proceed to OR as scheduled  - likely discharge to home following procedure

## 2017-12-01 NOTE — ASU DISCHARGE PLAN (ADULT/PEDIATRIC). - FOLLOWUP APPOINTMENT CLINIC/PHYSICIAN
Please follow up with Dr. Rich on Monday 12/11. Please call [phone] to schedule and appointment.

## 2017-12-04 ENCOUNTER — TRANSCRIPTION ENCOUNTER (OUTPATIENT)
Age: 62
End: 2017-12-04

## 2017-12-05 ENCOUNTER — OTHER (OUTPATIENT)
Age: 62
End: 2017-12-05

## 2017-12-06 ENCOUNTER — APPOINTMENT (OUTPATIENT)
Dept: RHEUMATOLOGY | Facility: CLINIC | Age: 62
End: 2017-12-06

## 2017-12-11 ENCOUNTER — APPOINTMENT (OUTPATIENT)
Dept: VASCULAR SURGERY | Facility: CLINIC | Age: 62
End: 2017-12-11
Payer: COMMERCIAL

## 2017-12-11 VITALS
DIASTOLIC BLOOD PRESSURE: 91 MMHG | WEIGHT: 104 LBS | SYSTOLIC BLOOD PRESSURE: 166 MMHG | HEART RATE: 65 BPM | HEIGHT: 60 IN | TEMPERATURE: 98.3 F | BODY MASS INDEX: 20.42 KG/M2

## 2017-12-11 PROCEDURE — 99024 POSTOP FOLLOW-UP VISIT: CPT

## 2018-03-15 ENCOUNTER — APPOINTMENT (OUTPATIENT)
Dept: OTOLARYNGOLOGY | Facility: CLINIC | Age: 63
End: 2018-03-15
Payer: MEDICARE

## 2018-03-15 VITALS
DIASTOLIC BLOOD PRESSURE: 80 MMHG | HEART RATE: 75 BPM | WEIGHT: 100 LBS | HEIGHT: 60 IN | BODY MASS INDEX: 19.63 KG/M2 | SYSTOLIC BLOOD PRESSURE: 140 MMHG

## 2018-03-15 DIAGNOSIS — H90.3 SENSORINEURAL HEARING LOSS, BILATERAL: ICD-10-CM

## 2018-03-15 DIAGNOSIS — D44.7 NEOPLASM OF UNCERTAIN BEHAVIOR OF AORTIC BODY AND OTHER PARAGANGLIA: ICD-10-CM

## 2018-03-15 DIAGNOSIS — Z87.39 PERSONAL HISTORY OF OTHER DISEASES OF THE MUSCULOSKELETAL SYSTEM AND CONNECTIVE TISSUE: ICD-10-CM

## 2018-03-15 DIAGNOSIS — H93.A1 PULSATILE TINNITUS, RIGHT EAR: ICD-10-CM

## 2018-03-15 DIAGNOSIS — Z09 ENCOUNTER FOR FOLLOW-UP EXAMINATION AFTER COMPLETED TREATMENT FOR CONDITIONS OTHER THAN MALIGNANT NEOPLASM: ICD-10-CM

## 2018-03-15 DIAGNOSIS — D33.3 BENIGN NEOPLASM OF CRANIAL NERVES: ICD-10-CM

## 2018-03-15 PROCEDURE — 99213 OFFICE O/P EST LOW 20 MIN: CPT

## 2018-03-21 RX ORDER — VENLAFAXINE HCL 50 MG
TABLET ORAL
Refills: 0 | Status: ACTIVE | COMMUNITY

## 2018-03-21 RX ORDER — DOXYCYCLINE 100 MG/1
100 CAPSULE ORAL
Refills: 0 | Status: ACTIVE | COMMUNITY

## 2018-04-26 NOTE — ED ADULT NURSE NOTE - PAIN RATING/NUMBER SCALE (0-10): REST
Mother states pt was seen yesterday and Dr Cronin was going to send 2 Rxs to pharmacy. Mom says she checked w/ all pharmacies she's ever used and none have the Rx. EHR shows Rxs sent to [facility], [address], [place]. Called [facility] and they said they did not receive these Rxs. Resent azithromycin and Kenalog ointment to [facility] per Rx in EHR. Mom informed and voiced understanding.  Columba Hahn RN/FNA     2

## 2018-05-24 PROBLEM — Z09 POSTOP CHECK: Status: RESOLVED | Noted: 2017-12-11 | Resolved: 2018-05-24

## 2018-05-24 PROBLEM — Z87.39 HISTORY OF TEMPORAL ARTERITIS: Status: RESOLVED | Noted: 2017-11-20 | Resolved: 2018-05-24

## 2019-10-28 PROBLEM — F32.9 MAJOR DEPRESSIVE DISORDER, SINGLE EPISODE, UNSPECIFIED: Chronic | Status: ACTIVE | Noted: 2017-12-01

## 2019-10-28 PROBLEM — M31.6 OTHER GIANT CELL ARTERITIS: Chronic | Status: ACTIVE | Noted: 2017-11-18

## 2019-10-28 PROBLEM — I10 ESSENTIAL (PRIMARY) HYPERTENSION: Chronic | Status: ACTIVE | Noted: 2017-11-16

## 2019-10-28 PROBLEM — E78.5 HYPERLIPIDEMIA, UNSPECIFIED: Chronic | Status: ACTIVE | Noted: 2017-11-16

## 2019-10-28 PROBLEM — F41.9 ANXIETY DISORDER, UNSPECIFIED: Chronic | Status: ACTIVE | Noted: 2017-11-16

## 2019-11-07 ENCOUNTER — APPOINTMENT (OUTPATIENT)
Dept: OTOLARYNGOLOGY | Facility: CLINIC | Age: 64
End: 2019-11-07

## 2021-10-19 NOTE — ASU PATIENT PROFILE, ADULT - BILL OF RIGHTS/ADMISSION INFORMATION PROVIDED TO:
Patient Itraconazole Counseling:  I discussed with the patient the risks of itraconazole including but not limited to liver damage, nausea/vomiting, neuropathy, and severe allergy.  The patient understands that this medication is best absorbed when taken with acidic beverages such as non-diet cola or ginger ale.  The patient understands that monitoring is required including baseline LFTs and repeat LFTs at intervals.  The patient understands that they are to contact us or the primary physician if concerning signs are noted.

## 2022-10-05 NOTE — ED ADULT NURSE NOTE - CARDIO ASSESSMENT
Patient calling stating abdominal pain is worse.   Was seen by Mary Jo on 9/29 and did what she was told. No other information given   Please advise   WDL

## 2024-01-12 NOTE — PRE-OP CHECKLIST - AS BP NONINV METHOD
- Patient has collection box for cologuard at home; will send.  - Discussed the need for shingles vaccination and advised patient that he could go to the local pharmacy.   Patient with chills, SOB and decreased po intake x approx 1 week electronic